# Patient Record
Sex: FEMALE | Race: WHITE | Employment: FULL TIME | URBAN - METROPOLITAN AREA
[De-identification: names, ages, dates, MRNs, and addresses within clinical notes are randomized per-mention and may not be internally consistent; named-entity substitution may affect disease eponyms.]

---

## 2022-07-28 ENCOUNTER — HOSPITAL ENCOUNTER (OUTPATIENT)
Dept: PHYSICAL THERAPY | Age: 27
Discharge: HOME | End: 2022-07-28
Payer: COMMERCIAL

## 2022-07-28 PROCEDURE — 97161 PT EVAL LOW COMPLEX 20 MIN: CPT | Performed by: PHYSICAL THERAPIST

## 2022-07-28 PROCEDURE — 97110 THERAPEUTIC EXERCISES: CPT | Performed by: PHYSICAL THERAPIST

## 2022-07-28 PROCEDURE — 97016 VASOPNEUMATIC DEVICE THERAPY: CPT | Performed by: PHYSICAL THERAPIST
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
## 2022-08-05 ENCOUNTER — HOSPITAL ENCOUNTER (OUTPATIENT)
Dept: PHYSICAL THERAPY | Age: 27
Discharge: HOME | End: 2022-08-05
Payer: COMMERCIAL

## 2022-08-05 PROCEDURE — 97016 VASOPNEUMATIC DEVICE THERAPY: CPT | Performed by: PHYSICAL THERAPIST

## 2022-08-05 PROCEDURE — 97140 MANUAL THERAPY 1/> REGIONS: CPT | Performed by: PHYSICAL THERAPIST

## 2022-08-05 PROCEDURE — 97110 THERAPEUTIC EXERCISES: CPT | Performed by: PHYSICAL THERAPIST

## 2022-08-05 NOTE — PROGRESS NOTES
PT DAILY TREATMENT NOTE - [facility] 2-15    Patient Name: Nohemi Durham  Date:2022  : 1995  [x]  Patient  Verified  Payor: Delmi Dela Cruz / Plan: [address] [place] [address] / Product Type: PPO /    In time: 829a  Out time: 927a  Total Treatment Time (min): 58  Total Timed Codes (min): 43  1:1 Treatment Time (MC only): 40   Visit #:  2    Treatment Area: Pain in left shoulder [M25.512]    SUBJECTIVE  Pain Level (0-10 scale): 5-6  Any medication changes, allergies to medications, adverse drug reactions, diagnosis change, or new procedure performed?: [x] No    [] Yes (see summary sheet for update)  Subjective functional status/changes:   [] No changes reported  Shoulder has been a little more sore and painful the last week. Some of the home exercises are bothersome, but tend to improve with repetition. Has been noticing more generalized soreness/pain and has taken to getting back on an NSAID regimen.      OBJECTIVE    Modality rationale: decrease edema, decrease inflammation, and decrease pain to improve the patients ability to reach without pain   Min Type Additional Details       [] Estim: []Att   []Unatt    []TENS instruct                  []IFC  []Premod   []NMES                     []Other:  []w/US   []w/ice   []w/heat  Position:  Location:       []  Traction: [] Cervical       []Lumbar                       [] Prone          []Supine                       []Intermittent   []Continuous Lbs:  [] before manual  [] after manual  []w/heat    []  Ultrasound: []Continuous   [] Pulsed                       at: []1MHz   []3MHz Location:  W/cm2:    [] Paraffin         Location:   []w/heat    []  Ice     []  Heat  []  Ice massage Position:  Location:    []  Laser  []  Other: Position:  Location:   15   [x]  Vasopneumatic Device Pressure:       [] lo [x] med [] hi   Temperature: 34deg     [x] Skin assessment post-treatment:  [x]intact []redness- no adverse reaction    []redness - adverse reaction:     35 min Therapeutic Exercise:  [x] See flow sheet: PROM flex/abduction   Rationale: increase ROM and increase strength to improve the patients ability to exercise, ride bikes without pain    8 min Manual Therapy: GH inf/post mobilizations GII-III, inf/post glides with concurrent IR/ER    Rationale: decrease pain, increase ROM, increase tissue extensibility, decrease trigger points, and increase postural awareness to improve the patients ability to reach without pain    With   [] TE   [] TA   [] Neuro   [] SC   [] other: Patient Education: [x] Review HEP    [] Progressed/Changed HEP based on:   [] positioning   [] body mechanics   [] transfers   [] heat/ice application    [] other:      Other Objective/Functional Measures: --     Pain Level (0-10 scale) post treatment: 0    ASSESSMENT/Changes in Function:   Initial HEP appears to have been too aggressive. So temporarily cut out standing ER, doorway pec stretch @90deg abd., and added in wall wash flex/scaption, sidelying ER, and 1 arm (gentle) doorway stretch. Encouraged Liddy to avoid all pain and not to push through to keep inflammation under control. Will re-check on Monday. Patient will continue to benefit from skilled PT services to modify and progress therapeutic interventions, address functional mobility deficits, address ROM deficits, address strength deficits, analyze and address soft tissue restrictions, and analyze and cue movement patterns to attain remaining goals. []  See Plan of Care  []  See progress note/recertification  []  See Discharge Summary         Progress towards goals / Updated goals:    Short Term Goals: To be accomplished in 4-6 treatments:               1. Pt will be able to demonstrate at least 120deg of shoulder abduction               2. Pt will be able to go for bike rides up to 5 miles without significant shoulder pain  Long Term Goals:  To be accomplished in 12-16 treatments:               1. Pt will be able to demonstrate full (160deg or greater) shoulder abduction without pain               2. Pt will be able to return to mountain biking without pain               3. Pt will be able to reach behind and cross body without pain               4. Pt will be able to self-manage care using updated HEP for improved independence               5. Improved FOTO score to 71 or better to demonstrate improved function    PLAN  []  Upgrade activities as tolerated     []  Continue plan of care  []  Update interventions per flow sheet       []  Discharge due to:_  []  Other:_      Romulo Neves, PT, DPT 8/5/2022

## 2022-08-08 ENCOUNTER — HOSPITAL ENCOUNTER (OUTPATIENT)
Dept: PHYSICAL THERAPY | Age: 27
Discharge: HOME | End: 2022-08-08
Payer: COMMERCIAL

## 2022-08-08 PROCEDURE — 97110 THERAPEUTIC EXERCISES: CPT | Performed by: PHYSICAL THERAPIST

## 2022-08-08 PROCEDURE — 97140 MANUAL THERAPY 1/> REGIONS: CPT | Performed by: PHYSICAL THERAPIST

## 2022-08-08 PROCEDURE — 97016 VASOPNEUMATIC DEVICE THERAPY: CPT | Performed by: PHYSICAL THERAPIST

## 2022-08-08 NOTE — PROGRESS NOTES
PT DAILY TREATMENT NOTE - [facility] 2-15    Patient Name: Brook Bowser  Date:2022  : 1995  [x]  Patient  Verified  Payor: Merlene Sessions / Plan: [address] [place] [address] / Product Type: PPO /    In time: 314p  Out time: 408p  Total Treatment Time (min): 54  Total Timed Codes (min): 44  1:1 Treatment Time ( only): 40   Visit #: 3    Treatment Area: Pain in left shoulder [M25.512]    SUBJECTIVE  Pain Level (0-10 scale): 3-4  Any medication changes, allergies to medications, adverse drug reactions, diagnosis change, or new procedure performed?: [x] No    [] Yes (see summary sheet for update)  Subjective functional status/changes:   [] No changes reported  Doing well today. Pain levels significantly improved since last session.      OBJECTIVE  Modality rationale: decrease edema, decrease inflammation, and decrease pain to improve the patients ability to reach without pain    Min Type Additional Details        [] Estim: []Att   []Unatt    []TENS instruct                  []IFC  []Premod   []NMES                     []Other: []w/US   []w/ice   []w/heat  Position:  Location:        []  Traction: [] Cervical       []Lumbar                       [] Prone          []Supine                       []Intermittent   []Continuous Lbs:  [] before manual  [] after manual  []w/heat     []  Ultrasound: []Continuous   [] Pulsed                      at: []1MHz   []3MHz Location:  W/cm2:     [] Paraffin        Location:  []w/heat     []  Ice     []  Heat  []  Ice massage Position:  Location:     []  Laser  []  Other: Position:  Location:   10    [x]  Vasopneumatic Device Pressure:       [] lo [x] med [] hi  Temperature: 34deg      [x] Skin assessment post-treatment:  [x]intact []redness- no adverse reaction    []redness - adverse reaction:      34 min Therapeutic Exercise:  [x] See flow sheet: PROM flex/abduction   Rationale: increase ROM and increase strength to improve the patients ability to exercise, ride bikes without pain     8 min Manual Therapy: GH inf/post mobilizations GII-III, inf/post glides with concurrent IR/ER    Rationale: decrease pain, increase ROM, increase tissue extensibility, decrease trigger points, and increase postural awareness to improve the patients ability to reach without pain     With   [] TE   [] TA   [] Neuro   [] SC   [] other: Patient Education: [x] Review HEP    [] Progressed/Changed HEP based on:  [] positioning   [] body mechanics   [] transfers   [] heat/ice application    [] other:      Other Objective/Functional Measures: --        Pain Level (0-10 scale) post treatment: 0     ASSESSMENT/Changes in Function:   Significant improvement since last session keeping her inflammation/pain under control. Some of the popping in the shoulder seems more consistent with [facility] [place] joint sprain v. Rotator cuff. Likely could be a combination of both given mechanism of injury and presentation. Patient will continue to benefit from skilled PT services to modify and progress therapeutic interventions, address functional mobility deficits, address ROM deficits, address strength deficits, analyze and address soft tissue restrictions, and analyze and cue movement patterns to attain remaining goals. []  See Plan of Care  []  See progress note/recertification  []  See Discharge Summary         Progress towards goals / Updated goals:     Short Term Goals: To be accomplished in 4-6 treatments:               1. Pt will be able to demonstrate at least 120deg of shoulder abduction MILD PROGRESS               2. Pt will be able to go for bike rides up to 5 miles without significant shoulder pain  Long Term Goals:  To be accomplished in 12-16 treatments:               1. Pt will be able to demonstrate full (160deg or greater) shoulder abduction without pain               2. Pt will be able to return to mountain biking without pain               3. Pt will be able to reach behind and cross body without pain               4. Pt will be able to self-manage care using updated HEP for improved independence               5. Improved FOTO score to 71 or better to demonstrate improved function     PLAN  [x]  Upgrade activities as tolerated     [x]  Continue plan of care  []  Update interventions per flow sheet       []  Discharge due to:_  []  Other:_      Tulio Mcbride, PT, DPT 8/8/2022

## 2022-08-11 ENCOUNTER — HOSPITAL ENCOUNTER (OUTPATIENT)
Dept: PHYSICAL THERAPY | Age: 27
Discharge: HOME | End: 2022-08-11
Payer: COMMERCIAL

## 2022-08-11 PROCEDURE — 97110 THERAPEUTIC EXERCISES: CPT

## 2022-08-11 PROCEDURE — 97014 ELECTRIC STIMULATION THERAPY: CPT

## 2022-08-11 PROCEDURE — 97140 MANUAL THERAPY 1/> REGIONS: CPT

## 2022-08-11 PROCEDURE — 97016 VASOPNEUMATIC DEVICE THERAPY: CPT

## 2022-08-11 NOTE — PROGRESS NOTES
PT DAILY TREATMENT NOTE - [facility] 2-15    Patient Name: Tammy Skinner  Date:2022  : 1995  [x]  Patient  Verified  Payor: Brittani Jeremy / Plan: [address] [place] [address] / Product Type: PPO /    In time: 10:15A  Out time: 11:24A  Total Treatment Time (min): 69  Total Timed Codes (min): 54  1:1 Treatment Time ( only): 52   Visit #: 4    Treatment Area: Pain in left shoulder [M25.512]    SUBJECTIVE  Pain Level (0-10 scale): 6-710  Any medication changes, allergies to medications, adverse drug reactions, diagnosis change, or new procedure performed?: [x] No    [] Yes (see summary sheet for update)  Subjective functional status/changes:   [] No changes reported  Pt reports more pain.  Noted more intermittent stabbing at random    OBJECTIVE  Modality rationale: decrease edema, decrease inflammation, and decrease pain to improve the patients ability to reach without pain    Min Type Additional Details      15  [x] Estim: []Att   [x]Unatt    []TENS instruct                  [x]IFC  []Premod   []NMES                     []Other: []w/US   [x]w/ice   []w/heat  Position:seated  Location: L shoulder        []  Traction: [] Cervical       []Lumbar                       [] Prone          []Supine                       []Intermittent   []Continuous Lbs:  [] before manual  [] after manual  []w/heat     []  Ultrasound: []Continuous   [] Pulsed                      at: []1MHz   []3MHz Location:  W/cm2:     [] Paraffin        Location:  []w/heat     []  Ice     []  Heat  []  Ice massage Position:  Location:     []  Laser  []  Other: Position:  Location:   15    [x]  Vasopneumatic Device Pressure:       [] lo [x] med [] hi  Temperature: 34deg      [x] Skin assessment post-treatment:  [x]intact []redness- no adverse reaction    []redness - adverse reaction:      34 min Therapeutic Exercise:  [x] See flow sheet: PROM flex/abduction   Rationale: increase ROM and increase strength to improve the patients ability to exercise, ride bikes without pain     20 min Manual Therapy: GH inf/post mobilizations GII-III, inf/post glides with concurrent IR/ER, STM/MFR L biceps, UT, L subscap release pec release   Rationale: decrease pain, increase ROM, increase tissue extensibility, decrease trigger points, and increase postural awareness to improve the patients ability to reach without pain     With   [] TE   [] TA   [] Neuro   [] SC   [] other: Patient Education: [x] Review HEP    [] Progressed/Changed HEP based on:  [] positioning   [] body mechanics   [] transfers   [] heat/ice application    [] other:      Other Objective/Functional Measures: --        Pain Level (0-10 scale) post treatment: 6-7/10     ASSESSMENT/Changes in Function:   Minimal improvement with today's session. Added in shoulder isometrics and advised to keep wall wash in pain free range for HEP at this time. Will assess response next session. Patient will continue to benefit from skilled PT services to modify and progress therapeutic interventions, address functional mobility deficits, address ROM deficits, address strength deficits, analyze and address soft tissue restrictions, and analyze and cue movement patterns to attain remaining goals. []  See Plan of Care  []  See progress note/recertification  []  See Discharge Summary         Progress towards goals / Updated goals:     Short Term Goals: To be accomplished in 4-6 treatments:               1. Pt will be able to demonstrate at least 120deg of shoulder abduction MILD PROGRESS               2. Pt will be able to go for bike rides up to 5 miles without significant shoulder pain  Long Term Goals:  To be accomplished in 12-16 treatments:               1. Pt will be able to demonstrate full (160deg or greater) shoulder abduction without pain               2. Pt will be able to return to mountain biking without pain               3. Pt will be able to reach behind and cross body without pain               4. Pt will be able to self-manage care using updated HEP for improved independence               5. Improved FOTO score to 71 or better to demonstrate improved function     PLAN  [x]  Upgrade activities as tolerated     [x]  Continue plan of care  []  Update interventions per flow sheet       []  Discharge due to:_  []  Other:_      Leonides Hinojosa, EVERETT 8/11/2022

## 2022-08-15 ENCOUNTER — HOSPITAL ENCOUNTER (OUTPATIENT)
Dept: PHYSICAL THERAPY | Age: 27
Discharge: HOME | End: 2022-08-15
Payer: COMMERCIAL

## 2022-08-15 PROCEDURE — 97110 THERAPEUTIC EXERCISES: CPT

## 2022-08-15 PROCEDURE — 97014 ELECTRIC STIMULATION THERAPY: CPT

## 2022-08-15 PROCEDURE — 97016 VASOPNEUMATIC DEVICE THERAPY: CPT

## 2022-08-15 PROCEDURE — 97140 MANUAL THERAPY 1/> REGIONS: CPT

## 2022-08-15 NOTE — PROGRESS NOTES
PT DAILY TREATMENT NOTE - [facility] 2-15    Patient Name: Aleyda Cruz  Date:8/15/2022  : 1995  [x]  Patient  Verified  Payor: Dago Herr / Plan: [address] [place] [address] / Product Type: PPO /    In time: 8:34A Out time: 9:33A  Total Treatment Time (min): 59  Total Timed Codes (min): 49  1:1 Treatment Time ( only): 45   Visit #: 5    Treatment Area: Pain in left shoulder [M25.512]    SUBJECTIVE  Pain Level (0-10 scale): 5/10  Any medication changes, allergies to medications, adverse drug reactions, diagnosis change, or new procedure performed?: [x] No    [] Yes (see summary sheet for update)  Subjective functional status/changes:   [] No changes reported  Pt reported feeling much better the day after her last session. Webb good over the weekend . Woke up this morning more general ache but not sharp pain that she was feeling.      OBJECTIVE  Modality rationale: decrease edema, decrease inflammation, and decrease pain to improve the patients ability to reach without pain    Min Type Additional Details      10 [x] Estim: []Att   [x]Unatt    []TENS instruct                  [x]IFC  []Premod   []NMES                     []Other: []w/US   [x]w/ice   []w/heat  Position:seated  Location: L shoulder        []  Traction: [] Cervical       []Lumbar                       [] Prone          []Supine                       []Intermittent   []Continuous Lbs:  [] before manual  [] after manual  []w/heat     []  Ultrasound: []Continuous   [] Pulsed                      at: []1MHz   []3MHz Location:  W/cm2:     [] Paraffin        Location:  []w/heat     []  Ice     []  Heat  []  Ice massage Position:  Location:     []  Laser  []  Other: Position:  Location:   10    [x]  Vasopneumatic Device Pressure:       [] lo [x] med [] hi  Temperature: 34deg      [x] Skin assessment post-treatment:  [x]intact []redness- no adverse reaction    []redness - adverse reaction:      29 min Therapeutic Exercise:  [x] See flow sheet: PROM flex/abduction   Rationale: increase ROM and increase strength to improve the patients ability to exercise, ride bikes without pain     20 min Manual Therapy: GH inf/post mobilizations GII-III, inf/post glides with concurrent IR/ER, STM/MFR L biceps, UT, L subscap release pec release   Rationale: decrease pain, increase ROM, increase tissue extensibility, decrease trigger points, and increase postural awareness to improve the patients ability to reach without pain     With   [] TE   [] TA   [] Neuro   [] SC   [] other: Patient Education: [x] Review HEP    [] Progressed/Changed HEP based on:  [] positioning   [] body mechanics   [] transfers   [] heat/ice application    [] other:      Other Objective/Functional Measures: --        Pain Level (0-10 scale) post treatment: 0/10     ASSESSMENT/Changes in Function:   Added in median nerve glides with arm lower. Noted improvement in symptoms with this. Encouraged pt to take breaks throughout day and utilizing ice more. Patient will continue to benefit from skilled PT services to modify and progress therapeutic interventions, address functional mobility deficits, address ROM deficits, address strength deficits, analyze and address soft tissue restrictions, and analyze and cue movement patterns to attain remaining goals. []  See Plan of Care  []  See progress note/recertification  []  See Discharge Summary         Progress towards goals / Updated goals:     Short Term Goals: To be accomplished in 4-6 treatments:               1. Pt will be able to demonstrate at least 120deg of shoulder abduction MILD PROGRESS               2. Pt will be able to go for bike rides up to 5 miles without significant shoulder pain  Long Term Goals:  To be accomplished in 12-16 treatments:               1. Pt will be able to demonstrate full (160deg or greater) shoulder abduction without pain               2. Pt will be able to return to mountain biking without pain               3. Pt will be able to reach behind and cross body without pain               4. Pt will be able to self-manage care using updated HEP for improved independence               5. Improved FOTO score to 71 or better to demonstrate improved function     PLAN  [x]  Upgrade activities as tolerated     [x]  Continue plan of care  []  Update interventions per flow sheet       []  Discharge due to:_  []  Other:_      Tana Marie, PTA 8/15/2022

## 2022-08-18 ENCOUNTER — HOSPITAL ENCOUNTER (OUTPATIENT)
Dept: PHYSICAL THERAPY | Age: 27
Discharge: HOME | End: 2022-08-18
Payer: COMMERCIAL

## 2022-08-18 PROCEDURE — 97014 ELECTRIC STIMULATION THERAPY: CPT | Performed by: PHYSICAL THERAPIST

## 2022-08-18 PROCEDURE — 97110 THERAPEUTIC EXERCISES: CPT | Performed by: PHYSICAL THERAPIST

## 2022-08-18 PROCEDURE — 97140 MANUAL THERAPY 1/> REGIONS: CPT | Performed by: PHYSICAL THERAPIST

## 2022-08-18 NOTE — PROGRESS NOTES
PT DAILY TREATMENT NOTE - [facility] 215    Patient Name: Charles Mansfield  Date:2022  : 1995  [x]  Patient  Verified  Payor: Fayetta Hamman / Plan: [address] [place] [address] / Product Type: PPO /    In time:   Out time:   Total Treatment Time (min): 63  Total Timed Codes (min): 48  1:1 Treatment Time ( only): 44   Visit #:  6    Treatment Area: Pain in left shoulder [M25.512]    SUBJECTIVE  Pain Level (0-10 scale): 1  Any medication changes, allergies to medications, adverse drug reactions, diagnosis change, or new procedure performed?: [x] No    [] Yes (see summary sheet for update)  Subjective functional status/changes:   [] No changes reported  Doing much better overall. Has been able to raise her arm into abduction without any signifcant difficulty.      OBJECTIVE  Modality rationale: decrease edema, decrease inflammation, and decrease pain to improve the patients ability to reach without pain     Min Type Additional Details      15 [x] Estim: []Att   [x]Unatt    []TENS instruct                  [x]IFC  []Premod   []NMES                     []Other: []w/US   [x]w/ice   []w/heat  Position:seated  Location: L shoulder        []  Traction: [] Cervical       []Lumbar                       [] Prone          []Supine                       []Intermittent   []Continuous Lbs:  [] before manual  [] after manual  []w/heat     []  Ultrasound: []Continuous   [] Pulsed                      at: []1MHz   []3MHz Location:  W/cm2:     [] Paraffin        Location:  []w/heat     []  Ice     []  Heat  []  Ice massage Position:  Location:     []  Laser  []  Other: Position:  Location:       []  Vasopneumatic Device Pressure:       [] lo [x] med [] hi  Temperature: 34deg      [x] Skin assessment post-treatment:  [x]intact []redness- no adverse reaction    []redness - adverse reaction:      38 min Therapeutic Exercise:  [x] See flow sheet: PROM flex/abduction   Rationale: increase ROM and increase strength to improve the patients ability to exercise, ride bikes without pain     10 min Manual Therapy: GH inf/post mobilizations GII-III, inf/post glides with concurrent IR/ER, STM/MFR L biceps   Rationale: decrease pain, increase ROM, increase tissue extensibility, decrease trigger points, and increase postural awareness to improve the patients ability to reach without pain     With   [] TE   [] TA   [] Neuro   [] SC   [] other: Patient Education: [x] Review HEP    [] Progressed/Changed HEP based on:  [] positioning   [] body mechanics   [] transfers   [] heat/ice application    [] other:      Other Objective/Functional Measures: --        Pain Level (0-10 scale) post treatment: 0-1/10     ASSESSMENT/Changes in Function:   Significant improvement with pain and mobility today as she has been maintaining an anti-inflammatory regimen. Abduction AROM to at least 120deg today without pain. Still some discomfort with AAROM abduction with wand today, but otherwise did well. Patient will continue to benefit from skilled PT services to modify and progress therapeutic interventions, address functional mobility deficits, address ROM deficits, address strength deficits, analyze and address soft tissue restrictions, and analyze and cue movement patterns to attain remaining goals. []  See Plan of Care  []  See progress note/recertification  []  See Discharge Summary         Progress towards goals / Updated goals:     Short Term Goals: To be accomplished in 4-6 treatments:               1. Pt will be able to demonstrate at least 120deg of shoulder abduction MILD PROGRESS               2. Pt will be able to go for bike rides up to 5 miles without significant shoulder pain NOT YET ATTEMPTED  Long Term Goals:  To be accomplished in 12-16 treatments:               1. Pt will be able to demonstrate full (160deg or greater) shoulder abduction without pain               2. Pt will be able to return to mountain biking without pain               3. Pt will be able to reach behind and cross body without pain               4. Pt will be able to self-manage care using updated HEP for improved independence               5. Improved FOTO score to 71 or better to demonstrate improved function     PLAN  [x]  Upgrade activities as tolerated     [x]  Continue plan of care  []  Update interventions per flow sheet       []  Discharge due to:_  []  Other:_      Guadalupe Fleischer, PT, DPT 8/18/2022

## 2022-08-22 ENCOUNTER — APPOINTMENT (OUTPATIENT)
Dept: PHYSICAL THERAPY | Age: 27
End: 2022-08-22
Payer: COMMERCIAL

## 2022-08-25 ENCOUNTER — HOSPITAL ENCOUNTER (OUTPATIENT)
Dept: PHYSICAL THERAPY | Age: 27
Discharge: HOME | End: 2022-08-25
Payer: COMMERCIAL

## 2022-08-25 PROCEDURE — 97014 ELECTRIC STIMULATION THERAPY: CPT | Performed by: PHYSICAL THERAPIST

## 2022-08-25 PROCEDURE — 97140 MANUAL THERAPY 1/> REGIONS: CPT | Performed by: PHYSICAL THERAPIST

## 2022-08-25 PROCEDURE — 97110 THERAPEUTIC EXERCISES: CPT | Performed by: PHYSICAL THERAPIST

## 2022-08-25 NOTE — PROGRESS NOTES
PT DAILY TREATMENT NOTE - [facility] 2-15    Patient Name: Jessica Schumacher  Date:2022  : 1995  [x]  Patient  Verified  Payor: Terence Ortiz / Plan: [address] [place] [address] / Product Type: PPO /    In time: 931a  Out time: 1031a  Total Treatment Time (min): 60  Total Timed Codes (min): 50  1:1 Treatment Time ( only): 55   Visit #:  7    Treatment Area: Pain in left shoulder [M25.512]    SUBJECTIVE  Pain Level (0-10 scale): 1  Any medication changes, allergies to medications, adverse drug reactions, diagnosis change, or new procedure performed?: [x] No    [] Yes (see summary sheet for update)  Subjective functional status/changes:   [] No changes reported  Doing really well since last visit. Went paddleboarding last weekend and didn't have any significant issues afterwards.     OBJECTIVE         Modality rationale: decrease edema, decrease inflammation, and decrease pain to improve the patients ability to reach without pain     Min Type Additional Details      10 [x] Estim: []Att   [x]Unatt    []TENS instruct                  [x]IFC  []Premod   []NMES                     []Other: []w/US   [x]w/ice   []w/heat  Position:seated  Location: L shoulder        []  Traction: [] Cervical       []Lumbar                       [] Prone          []Supine                       []Intermittent   []Continuous Lbs:  [] before manual  [] after manual  []w/heat     []  Ultrasound: []Continuous   [] Pulsed                      at: []1MHz   []3MHz Location:  W/cm2:     [] Paraffin        Location:  []w/heat     []  Ice     []  Heat  []  Ice massage Position:  Location:     []  Laser  []  Other: Position:  Location:        []  Vasopneumatic Device Pressure:       [] lo [x] med [] hi  Temperature: 34deg      [x] Skin assessment post-treatment:  [x]intact []redness- no adverse reaction    []redness - adverse reaction:      40 min Therapeutic Exercise:  [x] See flow sheet: PROM flex/abduction   Rationale: increase ROM and increase strength to improve the patients ability to exercise, ride bikes without pain     10 min Manual Therapy: GH inf/post mobilizations GII-III, inf/post glides with concurrent IR/ER   Rationale: decrease pain, increase ROM, increase tissue extensibility, decrease trigger points, and increase postural awareness to improve the patients ability to reach without pain     With   [] TE   [] TA   [] Neuro   [] SC   [] other: Patient Education: [x] Review HEP    [] Progressed/Changed HEP based on:  [] positioning   [] body mechanics   [] transfers   [] heat/ice application    [] other:      Other Objective/Functional Measures: --        Pain Level (0-10 scale) post treatment: 0/10     ASSESSMENT/Changes in Function:   Did really well today. She had full abduction ROM and pain-free today. Scaption was also not painful for the first time today. Improving at each session these last few weeks. Patient will continue to benefit from skilled PT services to modify and progress therapeutic interventions, address functional mobility deficits, address ROM deficits, address strength deficits, analyze and address soft tissue restrictions, and analyze and cue movement patterns to attain remaining goals. []  See Plan of Care  []  See progress note/recertification  []  See Discharge Summary         Progress towards goals / Updated goals:  Short Term Goals: To be accomplished in 4-6 treatments:               1. Pt will be able to demonstrate at least 120deg of shoulder abduction MILD PROGRESS               2. Pt will be able to go for bike rides up to 5 miles without significant shoulder pain NOT YET ATTEMPTED  Long Term Goals:  To be accomplished in 12-16 treatments:               1. Pt will be able to demonstrate full (160deg or greater) shoulder abduction without pain               2. Pt will be able to return to mountain biking without pain               3. Pt will be able to reach behind and cross body without pain               4. Pt will be able to self-manage care using updated HEP for improved independence               5. Improved FOTO score to 71 or better to demonstrate improved function     PLAN  [x]  Upgrade activities as tolerated     [x]  Continue plan of care  []  Update interventions per flow sheet       []  Discharge due to:_  []  Other:_      Thais Gillespie, PT, DPT 8/25/2022

## 2022-08-29 ENCOUNTER — HOSPITAL ENCOUNTER (OUTPATIENT)
Dept: PHYSICAL THERAPY | Age: 27
Discharge: HOME | End: 2022-08-29
Payer: COMMERCIAL

## 2022-08-29 PROCEDURE — 97140 MANUAL THERAPY 1/> REGIONS: CPT | Performed by: PHYSICAL THERAPIST

## 2022-08-29 PROCEDURE — 97016 VASOPNEUMATIC DEVICE THERAPY: CPT | Performed by: PHYSICAL THERAPIST

## 2022-08-29 PROCEDURE — 97110 THERAPEUTIC EXERCISES: CPT | Performed by: PHYSICAL THERAPIST

## 2022-08-29 PROCEDURE — 97014 ELECTRIC STIMULATION THERAPY: CPT | Performed by: PHYSICAL THERAPIST

## 2022-08-29 NOTE — PROGRESS NOTES
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 Patient states this is needed for him to go on a cruise.     Please advise   Patient will be contacted and scheduled if approved.    Physical Therapy at [facility],   a part of [facility], [address] [place] [address], [address] [place] [address]  Phone: [phone] Fax: [phone]    Progress Note    Name: Wilda Philip   : 1995   MD: Niranjan Sterling, Not On File, FNP     Treatment Diagnosis: Pain in left shoulder [M25.512]  Start of Care: 22    Visits from Start of Care: 8  Missed Visits: 0    Summary of Care: David is making good progress overall and is about 70% recovered at this time. She is getting improved shoulder ROM and function each week. We have proceeded cautiously with her wedding approaching in a few weeks, and have held on pushing into more strenuous activities for this reason, as previous flare ups of inflammation have set us back moving too quickly. SHOULDER ROM  Abd: 165deg  IR: T5/6  ER: T4    Short Term Goals: To be accomplished in 4-6 treatments:               1. Pt will be able to demonstrate at least 120deg of shoulder abduction MET               2. Pt will be able to go for bike rides up to 5 miles without significant shoulder pain NOT YET ATTEMPTED  Long Term Goals: To be accomplished in 12-16 treatments:               1. Pt will be able to demonstrate full (160deg or greater) shoulder abduction without pain 90% MET               2. Pt will be able to return to mountain biking without pain NOT YET ATTEMPTED               3. Pt will be able to reach behind and cross body without pain MET               4. Pt will be able to self-manage care using updated HEP for improved independence PROGRESSING               5. Improved FOTO score to 71 or better to demonstrate improved function    Assessment / Recommendations: Other: Continue to progress per tolerance, proceeding cautiously in order to promote proper healing and improved function.      Guadalupe Fleischer, PT, DPT 2022

## 2022-08-29 NOTE — PROGRESS NOTES
PT DAILY TREATMENT NOTE - [facility] 2-15    Patient Name: Aleyda Cruz  Date:2022  : 1995  [x]  Patient  Verified  Payor: BLUE CROSS / Plan: [address] [place] [address] / Product Type: PPO /    In time: 525p  Out time: 624p  Total Treatment Time (min): 59  Total Timed Codes (min): 44  1:1 Treatment Time ( only): 40   Visit #: 8    Treatment Area: Pain in left shoulder [M25.512]    SUBJECTIVE  Pain Level (0-10 scale): 2  Any medication changes, allergies to medications, adverse drug reactions, diagnosis change, or new procedure performed?: [x] No    [] Yes (see summary sheet for update)  Subjective functional status/changes:   [] No changes reported  Doing pretty well overall. She had 2 instances over the weekend where she tweaked the shoulder and it hurt, but the next day it was fine.     OBJECTIVE            Modality rationale: decrease edema, decrease inflammation, and decrease pain to improve the patients ability to reach without pain     Min Type Additional Details      15 [x] Estim: []Att   [x]Unatt    []TENS instruct                  [x]IFC  []Premod   []NMES                     []Other: []w/US   [x]w/ice   []w/heat  Position:seated  Location: L shoulder        []  Traction: [] Cervical       []Lumbar                       [] Prone          []Supine                       []Intermittent   []Continuous Lbs:  [] before manual  [] after manual  []w/heat     []  Ultrasound: []Continuous   [] Pulsed                      at: []1MHz   []3MHz Location:  W/cm2:     [] Paraffin        Location:  []w/heat     []  Ice     []  Heat  []  Ice massage Position:  Location:     []  Laser  []  Other: Position:  Location:      15  [x]  Vasopneumatic Device Pressure:       [] lo [x] med [] hi  Temperature: 34deg      [x] Skin assessment post-treatment:  [x]intact []redness- no adverse reaction    []redness - adverse reaction:      36 min Therapeutic Exercise:  [x] See flow sheet: PROM flex/abduction   Rationale: increase ROM and increase strength to improve the patients ability to exercise, ride bikes without pain     8 min Manual Therapy: GH inf/post mobilizations GII-III, inf/post glides with concurrent IR/ER   Rationale: decrease pain, increase ROM, increase tissue extensibility, decrease trigger points, and increase postural awareness to improve the patients ability to reach without pain     With   [] TE   [] TA   [] Neuro   [] SC   [] other: Patient Education: [x] Review HEP    [] Progressed/Changed HEP based on:  [] positioning   [] body mechanics   [] transfers   [] heat/ice application    [] other:      Other Objective/Functional Measures:  FOTO: 63        Pain Level (0-10 scale) post treatment: 0/10     ASSESSMENT/Changes in Function:   []  See Plan of Care  [x]  See progress note/recertification  []  See Discharge Summary            PLAN  [x]  Upgrade activities as tolerated     [x]  Continue plan of care  []  Update interventions per flow sheet       []  Discharge due to:_  []  Other:_      Karolyn Paige PT, DPT 8/29/2022

## 2022-08-31 ENCOUNTER — APPOINTMENT (OUTPATIENT)
Dept: PHYSICAL THERAPY | Age: 27
End: 2022-08-31
Payer: COMMERCIAL

## 2022-09-06 ENCOUNTER — HOSPITAL ENCOUNTER (OUTPATIENT)
Dept: PHYSICAL THERAPY | Age: 27
Discharge: HOME | End: 2022-09-06
Payer: COMMERCIAL

## 2022-09-06 PROCEDURE — 97016 VASOPNEUMATIC DEVICE THERAPY: CPT | Performed by: PHYSICAL THERAPIST

## 2022-09-06 PROCEDURE — 97110 THERAPEUTIC EXERCISES: CPT | Performed by: PHYSICAL THERAPIST

## 2022-09-06 PROCEDURE — 97140 MANUAL THERAPY 1/> REGIONS: CPT | Performed by: PHYSICAL THERAPIST

## 2022-09-06 PROCEDURE — 97014 ELECTRIC STIMULATION THERAPY: CPT | Performed by: PHYSICAL THERAPIST

## 2022-09-06 NOTE — PROGRESS NOTES
PT DAILY TREATMENT NOTE - [facility] 2-15    Patient Name: Christina Denny  Date:2022  : 1995  [x]  Patient  Verified  Payor: Kenzie Ferreira / Plan: [address] [place] [address] / Product Type: PPO /    In time: 0a  Out time: 755a  Total Treatment Time (min): 51  Total Timed Codes (min): 41  1:1 Treatment Time ( only): 38   Visit #:  9    Treatment Area: Pain in left shoulder [M25.512]    SUBJECTIVE  Pain Level (0-10 scale): 3  Any medication changes, allergies to medications, adverse drug reactions, diagnosis change, or new procedure performed?: [x] No    [] Yes (see summary sheet for update)  Subjective functional status/changes:   [] No changes reported  Shoulder has been doing really well. Slept funny on it last night and got a really sharp shot of pain but it dissipated quickly.      OBJECTIVE                Modality rationale: decrease edema, decrease inflammation, and decrease pain to improve the patients ability to reach without pain     Min Type Additional Details      10 [x] Estim: []Att   [x]Unatt    []TENS instruct                  [x]IFC  []Premod   []NMES                     []Other: []w/US   [x]w/ice   []w/heat  Position:seated  Location: L shoulder        []  Traction: [] Cervical       []Lumbar                       [] Prone          []Supine                       []Intermittent   []Continuous Lbs:  [] before manual  [] after manual  []w/heat     []  Ultrasound: []Continuous   [] Pulsed                      at: []1MHz   []3MHz Location:  W/cm2:     [] Paraffin        Location:  []w/heat     []  Ice     []  Heat  []  Ice massage Position:  Location:     []  Laser  []  Other: Position:  Location:      10  [x]  Vasopneumatic Device Pressure:       [] lo [x] med [] hi  Temperature: 34deg      [x] Skin assessment post-treatment:  [x]intact []redness- no adverse reaction    []redness - adverse reaction:      33 min Therapeutic Exercise:  [x] See flow sheet: PROM flex/abduction   Rationale: increase ROM and increase strength to improve the patients ability to exercise, ride bikes without pain     8 min Manual Therapy: GH inf/post mobilizations GII-III, inf/post glides with concurrent IR/ER   Rationale: decrease pain, increase ROM, increase tissue extensibility, decrease trigger points, and increase postural awareness to improve the patients ability to reach without pain     With   [] TE   [] TA   [] Neuro   [] SC   [] other: Patient Education: [x] Review HEP    [] Progressed/Changed HEP based on:  [] positioning   [] body mechanics   [] transfers   [] heat/ice application    [] other:      Other Objective/Functional Measures: --       Pain Level (0-10 scale) post treatment: 0/10    ASSESSMENT/Changes in Function:   Did well today and progress back into some light weight bearing exercise without pain  Patient will continue to benefit from skilled PT services to modify and progress therapeutic interventions, address functional mobility deficits, address ROM deficits, address strength deficits, analyze and address soft tissue restrictions, analyze and cue movement patterns, and analyze and modify body mechanics/ergonomics to attain remaining goals. []  See Plan of Care  []  See progress note/recertification  []  See Discharge Summary         Progress towards goals / Updated goals:  Short Term Goals: To be accomplished in 4-6 treatments:               1. Pt will be able to demonstrate at least 120deg of shoulder abduction MET               2. Pt will be able to go for bike rides up to 5 miles without significant shoulder pain NOT YET ATTEMPTED  Long Term Goals:  To be accomplished in 12-16 treatments:               1. Pt will be able to demonstrate full (160deg or greater) shoulder abduction without pain 90% MET               2. Pt will be able to return to mountain biking without pain NOT YET ATTEMPTED               3. Pt will be able to reach behind and cross body without pain MET               4. Pt will be able to self-manage care using updated HEP for improved independence PROGRESSING               5. Improved FOTO score to 71 or better to demonstrate improved function    PLAN  [x]  Upgrade activities as tolerated     [x]  Continue plan of care  []  Update interventions per flow sheet       []  Discharge due to:_  []  Other:_      Evin Mitchell, PT, DPT 9/6/2022

## 2022-09-09 ENCOUNTER — HOSPITAL ENCOUNTER (OUTPATIENT)
Dept: PHYSICAL THERAPY | Age: 27
Discharge: HOME | End: 2022-09-09
Payer: COMMERCIAL

## 2022-09-09 PROCEDURE — 97014 ELECTRIC STIMULATION THERAPY: CPT | Performed by: PHYSICAL THERAPIST

## 2022-09-09 PROCEDURE — 97110 THERAPEUTIC EXERCISES: CPT | Performed by: PHYSICAL THERAPIST

## 2022-09-09 PROCEDURE — 97140 MANUAL THERAPY 1/> REGIONS: CPT | Performed by: PHYSICAL THERAPIST

## 2022-09-09 PROCEDURE — 97016 VASOPNEUMATIC DEVICE THERAPY: CPT | Performed by: PHYSICAL THERAPIST

## 2022-09-09 NOTE — PROGRESS NOTES
PT DAILY TREATMENT NOTE - [facility] 2-15    Patient Name: Christina Denny  Date:2022  : 1995  [x]  Patient  Verified  Payor: Kenzie Ferreira / Plan: [address] [place] [address] / Product Type: PPO /    In time: 830a  Out time: 935a  Total Treatment Time (min): 65  Total Timed Codes (min): 50  1:1 Treatment Time ( only): 55   Visit #:  10    Treatment Area: Pain in left shoulder [M25.512]    SUBJECTIVE  Pain Level (0-10 scale): 0  Any medication changes, allergies to medications, adverse drug reactions, diagnosis change, or new procedure performed?: [x] No    [] Yes (see summary sheet for update)  Subjective functional status/changes:   [] No changes reported  Shoulder is doing really well.      OBJECTIVE              Modality rationale: decrease edema, decrease inflammation, and decrease pain to improve the patients ability to reach without pain     Min Type Additional Details      15 [x] Estim: []Att   [x]Unatt    []TENS instruct                  [x]IFC  []Premod   []NMES                     []Other: []w/US   [x]w/ice   []w/heat  Position:seated  Location: L shoulder        []  Traction: [] Cervical       []Lumbar                       [] Prone          []Supine                       []Intermittent   []Continuous Lbs:  [] before manual  [] after manual  []w/heat     []  Ultrasound: []Continuous   [] Pulsed                      at: []1MHz   []3MHz Location:  W/cm2:     [] Paraffin        Location:  []w/heat     []  Ice     []  Heat  []  Ice massage Position:  Location:     []  Laser  []  Other: Position:  Location:      15  [x]  Vasopneumatic Device Pressure:       [] lo [x] med [] hi  Temperature: 34deg      [x] Skin assessment post-treatment:  [x]intact []redness- no adverse reaction    []redness - adverse reaction:      42 min Therapeutic Exercise:  [x] See flow sheet: PROM flex/abduction   Rationale: increase ROM and increase strength to improve the patients ability to exercise, ride bikes without pain 8 min Manual Therapy: GH inf/post mobilizations GII-III, inf/post glides with concurrent IR/ER   Rationale: decrease pain, increase ROM, increase tissue extensibility, decrease trigger points, and increase postural awareness to improve the patients ability to reach without pain     With   [] TE   [] TA   [] Neuro   [] SC   [] other: Patient Education: [x] Review HEP    [] Progressed/Changed HEP based on:  [] positioning   [] body mechanics   [] transfers   [] heat/ice application    [] other:      Other Objective/Functional Measures: --       Pain Level (0-10 scale) post treatment: 0/10     ASSESSMENT/Changes in Function:   Looks really good today. Able to tolerate SB T exercise without pain for the first time. Patient will continue to benefit from skilled PT services to modify and progress therapeutic interventions, address functional mobility deficits, address ROM deficits, address strength deficits, analyze and address soft tissue restrictions, analyze and cue movement patterns, and analyze and modify body mechanics/ergonomics to attain remaining goals. []  See Plan of Care  []  See progress note/recertification  []  See Discharge Summary         Progress towards goals / Updated goals:  Short Term Goals: To be accomplished in 4-6 treatments:               1. Pt will be able to demonstrate at least 120deg of shoulder abduction MET               2. Pt will be able to go for bike rides up to 5 miles without significant shoulder pain NOT YET ATTEMPTED  Long Term Goals:  To be accomplished in 12-16 treatments:               1. Pt will be able to demonstrate full (160deg or greater) shoulder abduction without pain 90% MET               2. Pt will be able to return to mountain biking without pain NOT YET ATTEMPTED               3. Pt will be able to reach behind and cross body without pain MET               4. Pt will be able to self-manage care using updated HEP for improved independence PROGRESSING 5. Improved FOTO score to 71 or better to demonstrate improved function     PLAN  [x]  Upgrade activities as tolerated     [x]  Continue plan of care  []  Update interventions per flow sheet       []  Discharge due to:_  []  Other:_      Romulo Neves, PT, DPT 9/9/2022

## 2022-09-12 ENCOUNTER — HOSPITAL ENCOUNTER (OUTPATIENT)
Dept: PHYSICAL THERAPY | Age: 27
Discharge: HOME | End: 2022-09-12
Payer: COMMERCIAL

## 2022-09-12 PROCEDURE — 97014 ELECTRIC STIMULATION THERAPY: CPT | Performed by: PHYSICAL THERAPIST

## 2022-09-12 PROCEDURE — 97140 MANUAL THERAPY 1/> REGIONS: CPT | Performed by: PHYSICAL THERAPIST

## 2022-09-12 PROCEDURE — 97110 THERAPEUTIC EXERCISES: CPT | Performed by: PHYSICAL THERAPIST

## 2022-09-12 PROCEDURE — 97016 VASOPNEUMATIC DEVICE THERAPY: CPT | Performed by: PHYSICAL THERAPIST

## 2022-09-12 NOTE — PROGRESS NOTES
PT DAILY TREATMENT NOTE - [facility] 2-15    Patient Name: Kath Ragland  Date:2022  : 1995  [x]  Patient  Verified  Payor: David Munoz / Plan: [address] [place] [address] / Product Type: PPO /    In time: 230p  Out time: 330p  Total Treatment Time (min): 60  Total Timed Codes (min): 45  1:1 Treatment Time ( only): 40   Visit #: 11    Treatment Area: Pain in left shoulder [M25.512]    SUBJECTIVE  Pain Level (0-10 scale): 1  Any medication changes, allergies to medications, adverse drug reactions, diagnosis change, or new procedure performed?: [x] No    [] Yes (see summary sheet for update)  Subjective functional status/changes:   [] No changes reported  Doing well today; no issues with the shoulder over the weekend.      OBJECTIVE            Modality rationale: decrease edema, decrease inflammation, and decrease pain to improve the patients ability to reach without pain     Min Type Additional Details      15 [x] Estim: []Att   [x]Unatt    []TENS instruct                  [x]IFC  []Premod   []NMES                     []Other: []w/US   [x]w/ice   []w/heat  Position:seated  Location: L shoulder        []  Traction: [] Cervical       []Lumbar                       [] Prone          []Supine                       []Intermittent   []Continuous Lbs:  [] before manual  [] after manual  []w/heat     []  Ultrasound: []Continuous   [] Pulsed                      at: []1MHz   []3MHz Location:  W/cm2:     [] Paraffin        Location:  []w/heat     []  Ice     []  Heat  []  Ice massage Position:  Location:     []  Laser  []  Other: Position:  Location:      15  [x]  Vasopneumatic Device Pressure:       [] lo [x] med [] hi  Temperature: 34deg      [x] Skin assessment post-treatment:  [x]intact []redness- no adverse reaction    []redness - adverse reaction:      37 min Therapeutic Exercise:  [x] See flow sheet: PROM flex/abduction   Rationale: increase ROM and increase strength to improve the patients ability to exercise, ride bikes without pain     8 min Manual Therapy: GH inf/post mobilizations GII-III, inf/post glides with concurrent IR/ER   Rationale: decrease pain, increase ROM, increase tissue extensibility, decrease trigger points, and increase postural awareness to improve the patients ability to reach without pain     With   [] TE   [] TA   [] Neuro   [] SC   [] other: Patient Education: [x] Review HEP    [] Progressed/Changed HEP based on:  [] positioning   [] body mechanics   [] transfers   [] heat/ice application    [] other:      Other Objective/Functional Measures: --       Pain Level (0-10 scale) post treatment: 0/10     ASSESSMENT/Changes in Function:   Doing well; able to progress without issues. Patient will continue to benefit from skilled PT services to modify and progress therapeutic interventions, address functional mobility deficits, address ROM deficits, address strength deficits, analyze and address soft tissue restrictions, analyze and cue movement patterns, and analyze and modify body mechanics/ergonomics to attain remaining goals. []  See Plan of Care  []  See progress note/recertification  []  See Discharge Summary         Progress towards goals / Updated goals:  Short Term Goals: To be accomplished in 4-6 treatments:               1. Pt will be able to demonstrate at least 120deg of shoulder abduction MET               2. Pt will be able to go for bike rides up to 5 miles without significant shoulder pain NOT YET ATTEMPTED  Long Term Goals:  To be accomplished in 12-16 treatments:               1. Pt will be able to demonstrate full (160deg or greater) shoulder abduction without pain 90% MET               2. Pt will be able to return to mountain biking without pain NOT YET ATTEMPTED               3. Pt will be able to reach behind and cross body without pain MET               4. Pt will be able to self-manage care using updated HEP for improved independence PROGRESSING               5. Improved FOTO score to 71 or better to demonstrate improved function     PLAN  [x]  Upgrade activities as tolerated     [x]  Continue plan of care  []  Update interventions per flow sheet       []  Discharge due to:_  []  Other:_      Sherice Larios PT, DPT 9/12/2022

## 2022-09-28 ENCOUNTER — HOSPITAL ENCOUNTER (OUTPATIENT)
Dept: PHYSICAL THERAPY | Age: 27
Discharge: HOME | End: 2022-09-28
Payer: COMMERCIAL

## 2022-09-28 PROCEDURE — 97110 THERAPEUTIC EXERCISES: CPT | Performed by: PHYSICAL THERAPIST

## 2022-09-28 PROCEDURE — 97014 ELECTRIC STIMULATION THERAPY: CPT | Performed by: PHYSICAL THERAPIST

## 2022-09-28 PROCEDURE — 97140 MANUAL THERAPY 1/> REGIONS: CPT | Performed by: PHYSICAL THERAPIST

## 2022-09-28 PROCEDURE — 97016 VASOPNEUMATIC DEVICE THERAPY: CPT | Performed by: PHYSICAL THERAPIST

## 2022-09-28 NOTE — PROGRESS NOTES
PT DAILY TREATMENT NOTE - [facility] -15    Patient Name: Erick Durant  Date:2022  : 1995  [x]  Patient  Verified  Payor: [employer]UE [employer] / Plan: [address] [place] [address] / Product Type: PPO /    In time: 400p  Out time: 500p  Total Treatment Time (min): 60  Total Timed Codes (min): 45  1:1 Treatment Time ( only): 40   Visit #:  12    Treatment Area: Pain in left shoulder [M25.512]    SUBJECTIVE  Pain Level (0-10 scale): 2  Any medication changes, allergies to medications, adverse drug reactions, diagnosis change, or new procedure performed?: [x] No    [] Yes (see summary sheet for update)  Subjective functional status/changes:   [] No changes reported  Shoulder has been doing great overall. She went on a bike ride around town without problems. She has also gone on a short mountain bike ride this weekend without any problems.      OBJECTIVE  Modality rationale: decrease edema, decrease inflammation, and decrease pain to improve the patients ability to reach without pain     Min Type Additional Details      15 [x] Estim: []Att   [x]Unatt    []TENS instruct                  [x]IFC  []Premod   []NMES                     []Other: []w/US   [x]w/ice   []w/heat  Position:seated  Location: L shoulder        []  Traction: [] Cervical       []Lumbar                       [] Prone          []Supine                       []Intermittent   []Continuous Lbs:  [] before manual  [] after manual  []w/heat     []  Ultrasound: []Continuous   [] Pulsed                      at: []1MHz   []3MHz Location:  W/cm2:     [] Paraffin        Location:  []w/heat     []  Ice     []  Heat  []  Ice massage Position:  Location:     []  Laser  []  Other: Position:  Location:      15  [x]  Vasopneumatic Device Pressure:       [] lo [x] med [] hi  Temperature: 34deg      [x] Skin assessment post-treatment:  [x]intact []redness- no adverse reaction    []redness - adverse reaction:      37 min Therapeutic Exercise:  [x] See flow sheet: PROM flex/abduction   Rationale: increase ROM and increase strength to improve the patients ability to exercise, ride bikes without pain     8 min Manual Therapy: GH inf/post mobilizations GII-III, inf/post glides with concurrent IR/ER   Rationale: decrease pain, increase ROM, increase tissue extensibility, decrease trigger points, and increase postural awareness to improve the patients ability to reach without pain     With   [] TE   [] TA   [] Neuro   [] SC   [] other: Patient Education: [x] Review HEP    [] Progressed/Changed HEP based on:  [] positioning   [] body mechanics   [] transfers   [] heat/ice application    [] other:      Other Objective/Functional Measures: --       Pain Level (0-10 scale) post treatment: 0/10     ASSESSMENT/Changes in Function:   Progressed into D2 flex and wall ball exercises without pain today. Patient will continue to benefit from skilled PT services to modify and progress therapeutic interventions, address functional mobility deficits, address ROM deficits, address strength deficits, analyze and address soft tissue restrictions, analyze and cue movement patterns, and analyze and modify body mechanics/ergonomics to attain remaining goals. []  See Plan of Care  []  See progress note/recertification  []  See Discharge Summary         Progress towards goals / Updated goals:  Short Term Goals: To be accomplished in 4-6 treatments:               1. Pt will be able to demonstrate at least 120deg of shoulder abduction MET               2. Pt will be able to go for bike rides up to 5 miles without significant shoulder pain NOT YET ATTEMPTED  Long Term Goals:  To be accomplished in 12-16 treatments:               1. Pt will be able to demonstrate full (160deg or greater) shoulder abduction without pain 90% MET               2. Pt will be able to return to mountain biking without pain NOT YET ATTEMPTED               3. Pt will be able to reach behind and cross body without pain MET 4. Pt will be able to self-manage care using updated HEP for improved independence PROGRESSING               5. Improved FOTO score to 71 or better to demonstrate improved function     PLAN  [x]  Upgrade activities as tolerated     [x]  Continue plan of care  []  Update interventions per flow sheet       []  Discharge due to:_  []  Other:_    Ludy Holder, PT, DPT 9/28/2022

## 2022-10-03 ENCOUNTER — APPOINTMENT (OUTPATIENT)
Dept: PHYSICAL THERAPY | Age: 27
End: 2022-10-03
Payer: COMMERCIAL

## 2022-10-07 ENCOUNTER — HOSPITAL ENCOUNTER (OUTPATIENT)
Dept: PHYSICAL THERAPY | Age: 27
Discharge: HOME | End: 2022-10-07
Payer: COMMERCIAL

## 2022-10-07 PROCEDURE — 97110 THERAPEUTIC EXERCISES: CPT | Performed by: PHYSICAL THERAPIST

## 2022-10-07 PROCEDURE — 97140 MANUAL THERAPY 1/> REGIONS: CPT | Performed by: PHYSICAL THERAPIST

## 2022-10-07 NOTE — PROGRESS NOTES
PT DAILY TREATMENT NOTE - [facility] 2-15    Patient Name: Rosa Vicente  Date:10/7/2022  : 1995  [x]  Patient  Verified  Payor: Cosmo Gill / Plan: [address] [place] [address] / Product Type: PPO /    In time: 745a  Out time: 837a  Total Treatment Time (min): 52  Total Timed Codes (min): 52  1:1 Treatment Time ( only): 45   Visit #: 13    Treatment Area: Pain in left shoulder [M25.512]    SUBJECTIVE  Pain Level (0-10 scale): 0  Any medication changes, allergies to medications, adverse drug reactions, diagnosis change, or new procedure performed?: [x] No    [] Yes (see summary sheet for update)  Subjective functional status/changes:   [] No changes reported  Shoulder is feeling good, no pain right now. 1 mountain bike ride through [employer] without issue. OBJECTIVE  44 min Therapeutic Exercise:  [x] See flow sheet:    Rationale: increase ROM and increase strength to improve the patients ability to exercise, ride bikes without pain     8 min Manual Therapy: GH inf/post mobilizations GII-III, inf/post glides with concurrent IR/ER   Rationale: decrease pain, increase ROM, increase tissue extensibility, decrease trigger points, and increase postural awareness to improve the patients ability to reach without pain     With   [] TE   [] TA   [] Neuro   [] SC   [] other: Patient Education: [x] Review HEP    [] Progressed/Changed HEP based on:  [] positioning   [] body mechanics   [] transfers   [] heat/ice application    [] other:      Other Objective/Functional Measures: --       Pain Level (0-10 scale) post treatment: 0/10     ASSESSMENT/Changes in Function:   Excellent progress with progressions today. Added in assisted pull ups and inchworms without difficulty.    Patient will continue to benefit from skilled PT services to modify and progress therapeutic interventions, address functional mobility deficits, address ROM deficits, address strength deficits, analyze and address soft tissue restrictions, analyze and cue movement patterns, and analyze and modify body mechanics/ergonomics to attain remaining goals. []  See Plan of Care  []  See progress note/recertification  []  See Discharge Summary         Progress towards goals / Updated goals:  Short Term Goals: To be accomplished in 4-6 treatments:               1. Pt will be able to demonstrate at least 120deg of shoulder abduction MET               2. Pt will be able to go for bike rides up to 5 miles without significant shoulder pain NOT YET ATTEMPTED  Long Term Goals:  To be accomplished in 12-16 treatments:               1. Pt will be able to demonstrate full (160deg or greater) shoulder abduction without pain 90% MET               2. Pt will be able to return to mountain biking without pain NOT YET ATTEMPTED               3. Pt will be able to reach behind and cross body without pain MET               4. Pt will be able to self-manage care using updated HEP for improved independence PROGRESSING               5. Improved FOTO score to 71 or better to demonstrate improved function     PLAN  [x]  Upgrade activities as tolerated     [x]  Continue plan of care  []  Update interventions per flow sheet       []  Discharge due to:_  []  Other:_    Stacia Mcwilliams, PT, DPT 10/7/2022

## 2022-10-10 ENCOUNTER — HOSPITAL ENCOUNTER (OUTPATIENT)
Dept: PHYSICAL THERAPY | Age: 27
Discharge: HOME | End: 2022-10-10
Payer: COMMERCIAL

## 2022-10-10 PROCEDURE — 97140 MANUAL THERAPY 1/> REGIONS: CPT | Performed by: PHYSICAL THERAPIST

## 2022-10-10 PROCEDURE — 97110 THERAPEUTIC EXERCISES: CPT | Performed by: PHYSICAL THERAPIST

## 2022-10-10 NOTE — PROGRESS NOTES
PT DAILY TREATMENT NOTE - [facility] 2-15    Patient Name: Christy Fitzpatrick  Date:10/10/2022  : 1995  [x]  Patient  Verified  Payor: Radha Perez / Plan: [address] [place] [address] / Product Type: PPO /    In time: 401p  Out time: 457p  Total Treatment Time (min): 56  Total Timed Codes (min): 56  1:1 Treatment Time ( only): 56   Visit #: 14    Treatment Area: Pain in left shoulder [M25.512]    SUBJECTIVE  Pain Level (0-10 scale): 3  Any medication changes, allergies to medications, adverse drug reactions, diagnosis change, or new procedure performed?: [x] No    [] Yes (see summary sheet for update)  Subjective functional status/changes:   [] No changes reported  Did a road ride and a mountain bike ride this weekend without issue. She was good after Friday's session. OBJECTIVE  41 min Therapeutic Exercise:  [x] See flow sheet:    Rationale: increase ROM and increase strength to improve the patients ability to exercise, ride bikes without pain     15 min Manual Therapy: GH inf/post mobilizations GII-III, inf/post glides with concurrent IR/ER, MFR to L UT region   Rationale: decrease pain, increase ROM, increase tissue extensibility, decrease trigger points, and increase postural awareness to improve the patients ability to reach without pain     With   [] TE   [] TA   [] Neuro   [] SC   [] other: Patient Education: [x] Review HEP    [] Progressed/Changed HEP based on:  [] positioning   [] body mechanics   [] transfers   [] heat/ice application    [] other:      Other Objective/Functional Measures: FOTO: 80     Pain Level (0-10 scale) post treatment: 0/10     ASSESSMENT/Changes in Function:   Continues to demonstrate excellent overall progress. She is now back to mostly full workouts and progressing each week. Will hold on further PT at this time, and will have her work on her own. Will d/c if she doesn't need to return in 3-4 weeks.    Patient will continue to benefit from skilled PT services to modify and progress therapeutic interventions, address functional mobility deficits, address ROM deficits, address strength deficits, analyze and address soft tissue restrictions, analyze and cue movement patterns, and analyze and modify body mechanics/ergonomics to attain remaining goals. []  See Plan of Care  []  See progress note/recertification  []  See Discharge Summary         Progress towards goals / Updated goals:  Short Term Goals: To be accomplished in 4-6 treatments:               1. Pt will be able to demonstrate at least 120deg of shoulder abduction MET               2. Pt will be able to go for bike rides up to 5 miles without significant shoulder pain MET  Long Term Goals:  To be accomplished in 12-16 treatments:               1. Pt will be able to demonstrate full (160deg or greater) shoulder abduction without pain 90% MET               2. Pt will be able to return to mountain biking without pain MET               3. Pt will be able to reach behind and cross body without pain MET               4. Pt will be able to self-manage care using updated HEP for improved independence MET               5. Improved FOTO score to 71 or better to demonstrate improved function MET     PLAN  [x]  Upgrade activities as tolerated     [x]  Continue plan of care  []  Update interventions per flow sheet       []  Discharge due to:_  []  Other:_    Sandie Kumar, PT, DPT 10/10/2022

## 2022-10-12 ENCOUNTER — OFFICE VISIT (OUTPATIENT)
Dept: INTERNAL MEDICINE CLINIC | Age: 27
End: 2022-10-12
Payer: COMMERCIAL

## 2022-10-12 VITALS
OXYGEN SATURATION: 98 % | WEIGHT: 159 LBS | RESPIRATION RATE: 16 BRPM | TEMPERATURE: 98.5 F | HEART RATE: 76 BPM | SYSTOLIC BLOOD PRESSURE: 117 MMHG | BODY MASS INDEX: 27.14 KG/M2 | HEIGHT: 64 IN | DIASTOLIC BLOOD PRESSURE: 78 MMHG

## 2022-10-12 DIAGNOSIS — Z00.00 WELL WOMAN EXAM (NO GYNECOLOGICAL EXAM): ICD-10-CM

## 2022-10-12 DIAGNOSIS — R53.82 CHRONIC FATIGUE: Primary | ICD-10-CM

## 2022-10-12 DIAGNOSIS — F41.8 ANXIETY WITH DEPRESSION: ICD-10-CM

## 2022-10-12 DIAGNOSIS — S46.012A TRAUMATIC INCOMPLETE TEAR OF LEFT ROTATOR CUFF, INITIAL ENCOUNTER: ICD-10-CM

## 2022-10-12 DIAGNOSIS — S42.025A CLOSED NONDISPLACED FRACTURE OF SHAFT OF LEFT CLAVICLE, INITIAL ENCOUNTER: ICD-10-CM

## 2022-10-12 DIAGNOSIS — Z11.59 NEED FOR HEPATITIS C SCREENING TEST: ICD-10-CM

## 2022-10-12 PROCEDURE — 99213 OFFICE O/P EST LOW 20 MIN: CPT | Performed by: FAMILY MEDICINE

## 2022-10-12 PROCEDURE — 99385 PREV VISIT NEW AGE 18-39: CPT | Performed by: FAMILY MEDICINE
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
## 2022-12-16 NOTE — PROGRESS NOTES
Physical Therapy at [facility],   a part of [facility]  [address] [address], [address] [place] [address], [address] [place] [address]  Phone: [phone]  Fax: [phone]    Discharge Summary  2-15    Patient name: Ricardo Chapman  : 1995  Provider#: [id number]  Referral source: Alissa Perdomo MD      Medical/Treatment Diagnosis: Pain in left shoulder [M25.512]     Prior Hospitalization: see medical history     Comorbidities: anxiety/depression  Prior Level of Function: able to mountain bike, full use of L shoulder without pain  Medications: Verified on Patient Summary List     Start of Care: 22                                                                     Onset Date: 22           Visits from Start of Care: 14     Missed Visits: 0  Reporting Period : 22 to 10/10/22    ASSESSMENT/SUMMARY OF CARE: Ms. Bambi Frey made excellent progress over the course of 14 sessions addressing her L rotator cuff tear. She was back to mostly full function at last session on 10/10, and was able to continue to manage care independently thereafter. Will proceed with d/c at this time. Short Term Goals: To be accomplished in 4-6 treatments:               1. Pt will be able to demonstrate at least 120deg of shoulder abduction MET               2. Pt will be able to go for bike rides up to 5 miles without significant shoulder pain MET  Long Term Goals:  To be accomplished in 12-16 treatments:               1. Pt will be able to demonstrate full (160deg or greater) shoulder abduction without pain 90% MET               2. Pt will be able to return to mountain biking without pain MET               3. Pt will be able to reach behind and cross body without pain MET               4. Pt will be able to self-manage care using updated HEP for improved independence MET               5. Improved FOTO score to 71 or better to demonstrate improved function MET    RECOMMENDATIONS:  [x]Discontinue therapy: [x]Patient has reached or is progressing toward set goals      []Patient is non-compliant or has abdicated      []Due to lack of appreciable progress towards set [address] Gorman, PT, DPT 12/16/2022